# Patient Record
Sex: FEMALE | Race: WHITE | NOT HISPANIC OR LATINO | Employment: FULL TIME | URBAN - METROPOLITAN AREA
[De-identification: names, ages, dates, MRNs, and addresses within clinical notes are randomized per-mention and may not be internally consistent; named-entity substitution may affect disease eponyms.]

---

## 2019-11-02 ENCOUNTER — OFFICE VISIT (OUTPATIENT)
Dept: URGENT CARE | Facility: CLINIC | Age: 49
End: 2019-11-02

## 2019-11-02 VITALS
HEART RATE: 78 BPM | BODY MASS INDEX: 22.04 KG/M2 | WEIGHT: 132.28 LBS | SYSTOLIC BLOOD PRESSURE: 130 MMHG | HEIGHT: 65 IN | OXYGEN SATURATION: 99 % | DIASTOLIC BLOOD PRESSURE: 80 MMHG | TEMPERATURE: 99.7 F | RESPIRATION RATE: 16 BRPM
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
## 2019-11-02 RX ORDER — LEVOTHYROXINE SODIUM 0.15 MG/1
150 TABLET ORAL DAILY
COMMUNITY

## 2019-11-02 NOTE — PROGRESS NOTES
Laceration repair  Date/Time: 11/2/2019 1:45 PM  Performed by: Charmayne Altes, DO  Authorized by: Charmayne Altes, DO   Consent given by: patient  Patient identity confirmed: verbally with patient  Body area: head/neck  Location details: chin  Foreign bodies: no foreign bodies  Tendon involvement: none  Nerve involvement: none  Vascular damage: no    Wound Dehiscence:  Superficial Wound Dehiscence: simple closure      Procedure Details:  Irrigation solution: saline  Irrigation method: syringe  Amount of cleaning: standard  Debridement: none  Degree of undermining: none  Skin closure: glue  Approximation: loose  Approximation difficulty: simple  Dressing: antibiotic ointment  Comments: Instructions were given  Follow-up with family physician  Return here for any sign of infection including redness warmth or discharge